# Patient Record
Sex: MALE | Race: WHITE | NOT HISPANIC OR LATINO | URBAN - METROPOLITAN AREA
[De-identification: names, ages, dates, MRNs, and addresses within clinical notes are randomized per-mention and may not be internally consistent; named-entity substitution may affect disease eponyms.]

---

## 2022-10-19 PROBLEM — 428283002 HISTORY OF POLYP OF COLON: Status: ACTIVE | Noted: 2022-10-19

## 2022-12-12 ENCOUNTER — TELEPHONE ENCOUNTER (OUTPATIENT)
Dept: URBAN - METROPOLITAN AREA CLINIC 92 | Facility: CLINIC | Age: 67
End: 2022-12-12

## 2022-12-14 ENCOUNTER — CLAIMS CREATED FROM THE CLAIM WINDOW (OUTPATIENT)
Dept: URBAN - METROPOLITAN AREA SURGERY CENTER 15 | Facility: SURGERY CENTER | Age: 67
End: 2022-12-14

## 2022-12-14 ENCOUNTER — TELEPHONE ENCOUNTER (OUTPATIENT)
Dept: URBAN - METROPOLITAN AREA CLINIC 92 | Facility: CLINIC | Age: 67
End: 2022-12-14

## 2022-12-14 ENCOUNTER — CLAIMS CREATED FROM THE CLAIM WINDOW (OUTPATIENT)
Dept: URBAN - METROPOLITAN AREA SURGERY CENTER 15 | Facility: SURGERY CENTER | Age: 67
End: 2022-12-14
Payer: COMMERCIAL

## 2022-12-14 DIAGNOSIS — Z12.11 COLON CANCER SCREENING: ICD-10-CM

## 2022-12-14 PROCEDURE — 992 APS NON BILLABLE: Performed by: INTERNAL MEDICINE

## 2023-01-27 LAB
A/G RATIO: 1.6
ABSOLUTE BASOPHILS: 30
ABSOLUTE EOSINOPHILS: 50
ABSOLUTE LYMPHOCYTES: 1085
ABSOLUTE MONOCYTES: 350
ABSOLUTE NEUTROPHILS: 3485
ALBUMIN: 4.1
ALKALINE PHOSPHATASE: 56
ALT (SGPT): 13
AST (SGOT): 13
BASOPHILS: 0.6
BILIRUBIN, TOTAL: 1.6
BUN/CREATININE RATIO: (no result)
BUN: 10
CALCIUM: 9
CARBON DIOXIDE, TOTAL: 24
CHLORIDE: 105
CREATININE: 0.84
EGFR: 95
EOSINOPHILS: 1
GLOBULIN, TOTAL: 2.6
GLUCOSE: 115
HCV RNA, QUANTITATIVE REAL TIME PCR: <1.18
HCV RNA, QUANTITATIVE REAL TIME PCR: <15
HEMATOCRIT: 52.8
HEMOGLOBIN: 17.5
INR: 1.1
LYMPHOCYTES: 21.7
MCH: 29.1
MCHC: 33.1
MCV: 87.9
MONOCYTES: 7
MPV: 12.2
NEUTROPHILS: 69.7
PLATELET COUNT: 161
POTASSIUM: 4.6
PROTEIN, TOTAL: 6.7
PT: 10.9
RDW: 14.3
RED BLOOD CELL COUNT: 6.01
SODIUM: 138
WHITE BLOOD CELL COUNT: 5

## 2024-03-19 ENCOUNTER — LAB (OUTPATIENT)
Dept: URBAN - METROPOLITAN AREA CLINIC 111 | Facility: CLINIC | Age: 69
End: 2024-03-19

## 2024-03-19 ENCOUNTER — OV EP (OUTPATIENT)
Dept: URBAN - METROPOLITAN AREA CLINIC 111 | Facility: CLINIC | Age: 69
End: 2024-03-19

## 2024-03-19 ENCOUNTER — OV EP (OUTPATIENT)
Dept: URBAN - METROPOLITAN AREA CLINIC 111 | Facility: CLINIC | Age: 69
End: 2024-03-19
Payer: COMMERCIAL

## 2024-03-19 VITALS
TEMPERATURE: 98.6 F | HEIGHT: 76 IN | HEART RATE: 70 BPM | WEIGHT: 293.6 LBS | BODY MASS INDEX: 35.75 KG/M2 | DIASTOLIC BLOOD PRESSURE: 87 MMHG | SYSTOLIC BLOOD PRESSURE: 138 MMHG

## 2024-03-19 DIAGNOSIS — Z86.010 HISTORY OF ADENOMATOUS POLYP OF COLON: ICD-10-CM

## 2024-03-19 PROBLEM — 429047008: Status: ACTIVE | Noted: 2024-03-19

## 2024-03-19 PROCEDURE — 99203 OFFICE O/P NEW LOW 30 MIN: CPT | Performed by: PHYSICIAN ASSISTANT

## 2024-03-19 RX ORDER — POLYETHYLENE GLYCOL 3350, SODIUM SULFATE, SODIUM CHLORIDE, POTASSIUM CHLORIDE, ASCORBIC ACID, SODIUM ASCORBATE 140-9-5.2G
ML KIT ORAL AS DIRECTED
Qty: 1 KIT | Refills: 0 | OUTPATIENT
Start: 2024-03-19 | End: 2024-03-20

## 2024-03-19 NOTE — HPI-TODAY'S VISIT:
10/2019 by Dr. Verdugo revealing 3 polyps and non-bleeding internal hemorrhoids. Path revealing tubular adenomas and 1 benign polyp. Repeat was recommended in 2022. It looks like colon was cancelled that year d/t labs and blood pressure.

## 2024-03-19 NOTE — HPI-TODAY'S VISIT:
67 y/o male presents for a colonoscopy. The patient has a personal history of colon polyps. There is no family history of colon polyps or colon cancer. Patient denies change in bowel habits, appetite, and weight. Patient denies rectal bleeding. Patient denies any cardiac, lung, or kidney problems. Patient denies any digestive symptoms currently. Last colonoscopy: 10/2019 by Dr. Verdugo revealing 3 polyps and non-bleeding internal hemorrhoids. Path revealing tubular adenomas and 1 benign polyp. Repeat was recommended in 2022. It looks like colon was cancelled that year d/t labs and blood pressure.  He has been on BP meds for about a year. He has a stool 1-2 times a day. H/o Hep C and was treated. Viral load last year undetected.

## 2024-04-08 ENCOUNTER — COLON (OUTPATIENT)
Dept: URBAN - METROPOLITAN AREA LAB 3 | Facility: LAB | Age: 69
End: 2024-04-08
Payer: COMMERCIAL

## 2024-04-08 DIAGNOSIS — Z09 ENCOUNTER FOR COLONOSCOPY FOLLOWING COLON POLYP REMOVAL: ICD-10-CM

## 2024-04-08 DIAGNOSIS — Z86.010 ADENOMAS PERSONAL HISTORY OF COLONIC POLYPS: ICD-10-CM

## 2024-04-08 PROCEDURE — G0105 COLORECTAL SCRN; HI RISK IND: HCPCS | Performed by: INTERNAL MEDICINE
